# Patient Record
Sex: MALE | Race: WHITE
[De-identification: names, ages, dates, MRNs, and addresses within clinical notes are randomized per-mention and may not be internally consistent; named-entity substitution may affect disease eponyms.]

---

## 2022-10-06 ENCOUNTER — HOSPITAL ENCOUNTER (OUTPATIENT)
Dept: HOSPITAL 47 - RADXRMAIN | Age: 62
Discharge: HOME | End: 2022-10-06
Attending: FAMILY MEDICINE
Payer: COMMERCIAL

## 2022-10-06 DIAGNOSIS — R05.9: Primary | ICD-10-CM

## 2022-10-06 PROCEDURE — 71046 X-RAY EXAM CHEST 2 VIEWS: CPT

## 2022-10-07 NOTE — XR
EXAMINATION TYPE: XR chest 2V

 

DATE OF EXAM: 10/6/2022

 

COMPARISON: NONE

 

TECHNIQUE: PA and lateral views submitted.

 

HISTORY: Cough

 

FINDINGS:

The lungs are clear and  there is no pneumothorax, pleural effusion, or focal pneumonia.  Heart size 
normal. Mild hyperinflation. No overt failure. Calcified lymph nodes in the hilum and mediastinum. Ti
ny granuloma suspected in the retrosternal space and within the right lower lobe near the costophreni
c angle.

 

IMPRESSION: 

1. No acute process. Correlate for COPD or asthma with changes of chronic granulomatous disease.

## 2022-11-17 ENCOUNTER — HOSPITAL ENCOUNTER (OUTPATIENT)
Dept: HOSPITAL 47 - LABPAT | Age: 62
Discharge: HOME | End: 2022-11-17
Attending: ORTHOPAEDIC SURGERY
Payer: COMMERCIAL

## 2022-11-17 DIAGNOSIS — Z01.812: Primary | ICD-10-CM

## 2022-11-17 DIAGNOSIS — Z22.322: ICD-10-CM

## 2022-11-17 DIAGNOSIS — M17.12: ICD-10-CM

## 2022-11-17 PROCEDURE — 87070 CULTURE OTHR SPECIMN AEROBIC: CPT

## 2022-12-28 NOTE — HP
HISTORY AND PHYSICAL



DATE OF SURGERY:

12/29/2022.



HISTORY OF PRESENT ILLNESS:

Ricardo Romero is a 62-year-old gentleman, seen with progressive left knee pain.  After

having treatment options discussed, he elected to proceed with left total knee

arthroplasty.  Consent was obtained.  Medical clearance was provided by Dr. Rey Gallego.



PAST MEDICAL HISTORY:

Hypertension.



PAST SURGICAL HISTORY:

Herniorrhaphy and eye surgery.



DAILY MEDICATIONS:

Antihypertensive.



ALLERGIES:

None.



SOCIAL HISTORY:

He denies tobacco use.



PHYSICAL EVALUATION OF THE LEFT KNEE:

His range of motion is -2/3 to 120.  Moderate effusion.  Tenderness in medial joint

line.  Tenderness in lateral joint line.  Crepitus in medial and patellofemoral

compartments with range of motion.  Some pain with patellofemoral compression.

Ligaments are stable.  Hip rotation is without pain.  Distal neurovascular exam is

intact.



IMAGING STUDIES:

Radiographs of the left knee reveal severe osteoarthritic changes.



IMPRESSION:

1. Left knee osteoarthritis.

2. Hypertension.



PLAN:

Left total knee arthroplasty.





MMODL / IJN: [id number] / Job#: [id number]

## 2022-12-29 ENCOUNTER — HOSPITAL ENCOUNTER (OUTPATIENT)
Dept: HOSPITAL 47 - OR | Age: 62
Discharge: HOME HEALTH | End: 2022-12-29
Attending: ORTHOPAEDIC SURGERY
Payer: COMMERCIAL

## 2022-12-29 VITALS — HEART RATE: 93 BPM | DIASTOLIC BLOOD PRESSURE: 85 MMHG | SYSTOLIC BLOOD PRESSURE: 156 MMHG | RESPIRATION RATE: 17 BRPM

## 2022-12-29 VITALS — TEMPERATURE: 97.8 F

## 2022-12-29 DIAGNOSIS — Z98.890: ICD-10-CM

## 2022-12-29 DIAGNOSIS — I10: ICD-10-CM

## 2022-12-29 DIAGNOSIS — G89.18: ICD-10-CM

## 2022-12-29 DIAGNOSIS — M17.12: Primary | ICD-10-CM

## 2022-12-29 DIAGNOSIS — Z79.899: ICD-10-CM

## 2022-12-29 PROCEDURE — 27447 TOTAL KNEE ARTHROPLASTY: CPT

## 2022-12-29 PROCEDURE — 76942 ECHO GUIDE FOR BIOPSY: CPT

## 2022-12-29 PROCEDURE — 64999 UNLISTED PX NERVOUS SYSTEM: CPT

## 2022-12-29 PROCEDURE — 64448 NJX AA&/STRD FEM NRV NFS IMG: CPT

## 2022-12-29 PROCEDURE — 88300 SURGICAL PATH GROSS: CPT

## 2022-12-29 PROCEDURE — 73560 X-RAY EXAM OF KNEE 1 OR 2: CPT

## 2022-12-29 PROCEDURE — 64447 NJX AA&/STRD FEMORAL NRV IMG: CPT

## 2022-12-29 PROCEDURE — 97162 PT EVAL MOD COMPLEX 30 MIN: CPT

## 2022-12-29 RX ADMIN — HYDROMORPHONE HYDROCHLORIDE PRN MG: 1 INJECTION, SOLUTION INTRAMUSCULAR; INTRAVENOUS; SUBCUTANEOUS at 13:20

## 2022-12-29 RX ADMIN — POTASSIUM CHLORIDE SCH MLS: 14.9 INJECTION, SOLUTION INTRAVENOUS at 12:52

## 2022-12-29 RX ADMIN — HYDROMORPHONE HYDROCHLORIDE PRN MG: 1 INJECTION, SOLUTION INTRAMUSCULAR; INTRAVENOUS; SUBCUTANEOUS at 12:25

## 2022-12-29 RX ADMIN — POTASSIUM CHLORIDE SCH MLS: 14.9 INJECTION, SOLUTION INTRAVENOUS at 09:02

## 2022-12-29 RX ADMIN — HYDROMORPHONE HYDROCHLORIDE PRN MG: 1 INJECTION, SOLUTION INTRAMUSCULAR; INTRAVENOUS; SUBCUTANEOUS at 12:30

## 2022-12-29 NOTE — P.ANPRN
Procedure Note - Anesthesia





- Nerve Block Performed


  ** Right Daniel Single


Time Out Performed: Yes


Date of Procedure: 12/29/22


Procedure Start Time: 09:33


Procedure Stop Time: 09:39


Location of Patient: PreOp


Indication: Acute Post-Operative Pain, Requested by Surgeon


Sedation Type: Sedate with meaningful contact maintained


Preparation: Sterile Prep


Position: Supine


Needle Types: Pajunk


Needle Gauge: 21


Ultrasound used to visualize needle placement: Yes


Ultrasound used to observe medication spread: Yes


Blood Aspirated: No


Pain Paresthesia on Injection Noted: No


Resistance on Injection: Normal


Image Stored and Saved: Yes


Events: Uneventful and Well Tolerated (ropi .5% 25cc plus dexamethasone 4mg)

## 2022-12-29 NOTE — P.OP
Date of Procedure: 12/29/22


Preoperative Diagnosis: 


Left knee osteoarthritis


Postoperative Diagnosis: 


Left knee osteoarthritis


Procedure(s) Performed: 


Left total knee arthroplasty


Implants: 


1.  Depuy attune size 9 left cruciate retaining cemented femur


2.  Depuy attune size 9 fixed bearing cemented tibial baseplate


3.  Depuy attune size 9 fixed bearing cruciate retaining 10 mm polyethylene 

tibial insert


4.  Depuy attune 41 mm all polyethylene cemented patella


Anesthesia: GETA, regional (Adductor canal catheter, Ipack block)


Surgeon: Jarrett Ramirez


Assistant #1: Martín Parker


Estimated Blood Loss (ml): 45


Pathology: other (Bone)


Condition: stable


Disposition: PACU


Indications for Procedure: 


62-year-old patient seen with progressive left knee pain.  After treatment 

options were discussed, he elected to proceed with total knee arthroplasty.


Operative Findings: 


See description of procedure


Description of Procedure: 


Patient was taken to the operative suite after having an adductor canal catheter

placed by the department of anesthesia.  Patient underwent a general anesthetic 

by the department of anesthesia.  Patient was given preoperative IV intake 

antibiotics and TXA.  A well-padded tourniquet was placed about the left lower 

extremity.  The lower extremity was then prepped and draped in the normal 

sterile orthopedic fashion.  The extremity was elevated, a tourniquet was 

insufflated to 300.  A standard anterior incision was made sharply through skin.

 Dissection was taken down through the subcutaneous soft tissues down to the 

extensor mechanism.  A medial arthrotomy was performed, patella was everted and 

knee was flexed.  There was advanced osteoarthritis noted.  I introduced my 

distal intramedullary femoral drill.  I then introduced the distal femoral 

cutting jig.  Laci CARRERA secured the cutting jig with 2 pins.  I held 

retractors in position while Laci CARRERA performed the distal femoral 

resection through the guide area we now removed her distal femoral cutting 

guide.  We now placed our 4-in-1 femoral cutting block and positioned and it was

secured with 2 pins by Laci CARRERA while I held the block in position.  The 

distal femoral finishing was now completed.  A proximal tibial cutting guide was

positioned.  I held the guide in the appropriate position with both hands while 

Laic CARRERA inserted stabilizing pins into the guide.  Proximal tibial cut 

was made. We now placed a trial femoral component into position, along with an 

appropriate size tibial tray and insert.  We now took the knee through range of 

motion and had full extension good flexion and good overall soft tissue balance 

noted.  The patella was everted and stabilized with 2 towel clips held by Laci CARRERA while I performed a flush with patellar quad tendon utilizing a fresh 

sawblade.  We templated the patella, appropriate drill holes were made.  An 

appropriate trial patella was positioned, knee was taken through full range of 

motion with the patella tracking very nicely.  The trial patella was removed.  

Drill holes were made through the femoral component.  All trial components were 

removed after marking off the appropriate rotation of the tibia.  Retractors 

were now positioned along the proximal tibia.  An appropriate keel punch was 

made with the appropriate size tibial guide by myself on Laci CARRERA assisted

by holding retractors.  At this point appropriate size implants were chosen and 

opened.  The joint was irrigated copiously with pulse lavage mechanical 

irrigation. The wound was irrigated with pulse lavage mechanical irrigation.  We

mixed antibiotic methylmethacrylate.  We placed the knee into flexion.  We 

placed multiple retractors assisted by Laci CARRERA to expose the proximal 

tibia.  Once the methyl methacrylate was ready, the tibial component was 

cemented into place removing any excess methylmethacrylate form by both myself 

and Laci CARRERA.  The femoral component was cemented into place removing the 

removing any excess methylmethacrylate performed by both myself and Laci CARRERA.  We then inserted the appropriate size polyethylene tibial insert.  We made 

sure that it was locked into position.  We took the knee into full extension, 

and then back in a flexion making sure we had removed any excess 

methylmethacrylate.  The patellar component was then cemented down and secured 

with clamp.  Excess methylmethacrylate removed.  We kept the knee in full 

extension, patellar clamp in position until methylmethacrylate had hardened.  

Once it had hardened the patellar clamp was removed.  The knee was taken through

full range of motion.  The patella tracked nicely.  There was good soft tissue 

balancing.  The tourniquet was now released.  Additional hemostasis was achieved

via electrocautery.  A second gram of TXA was given.  The wound again was 

irrigated with pulse lavage mechanical irrigation. The extensor mechanism was 

repaired with Ethibond suture.  We checked the repair with range of motion and 

it was stable.  The subcutaneous soft tissues were repaired with Vicryl in 

layers.  The skin was approximated with pernio/Dermabond.  Sterile dressings 

were applied followed by loose web roll and Ace bandage.  The patient was linares

sferred to a bed, and taken to recovery in stable and satisfactory condition.  

Laci CARRERA assisted with this complex procedure.

## 2022-12-29 NOTE — P.ANPRN
Procedure Note - Anesthesia





- Nerve Block Performed


  ** Left Adductor Canal Infusion


Time Out Performed: Yes


Date of Procedure: 12/29/22


Procedure Start Time: 09:23


Procedure Stop Time: 09:32


Location of Patient: PreOp


Indication: Acute Post-Operative Pain


Sedation Type: Sedate with meaningful contact maintained


Preparation: Sterile Prep


Position: Supine


Catheter: Indwelling


Needle Types: Pajunk


Needle Gauge: 21


Ultrasound used to visualize needle placement: Yes


Ultrasound used to observe medication spread: Yes


Blood Aspirated: No


Pain Paresthesia on Injection Noted: No


Resistance on Injection: Normal


Image Stored and Saved: Yes


Events: Uneventful and Well Tolerated (ropi .5% 20cc plus dexamethasone 4mg)

## 2022-12-29 NOTE — XR
EXAMINATION TYPE: XR knee limited LT

 

DATE OF EXAM: 12/29/2022 12:39 PM

 

INDICATION: 

Patient age:Male;  62 years old; 

Reason for study: Evaluation for Postop abnormality and alignment; PHH. 

 

COMPARISON: None.

 

TECHNIQUE: The Left knee(s) was examined in Frontal, lateral projections.

 

FINDINGS:   Status post total knee arthroplasty changes with hardware in appropriate alignment and in
tact. No evidence of fracture. Subcutaneous lucencies and lucencies within the joint consistent with 
surgical changes. 

 

 

 

IMPRESSION: 

Status post total knee arthroplasty changes with hardware intact and appropriate alignment. No fractu
res identified.

## 2023-10-20 ENCOUNTER — HOSPITAL ENCOUNTER (OUTPATIENT)
Dept: HOSPITAL 47 - RADECHMAIN | Age: 63
Discharge: HOME | End: 2023-10-20
Attending: FAMILY MEDICINE
Payer: COMMERCIAL

## 2023-10-20 DIAGNOSIS — R22.43: Primary | ICD-10-CM

## 2023-10-20 PROCEDURE — 93306 TTE W/DOPPLER COMPLETE: CPT

## 2023-10-20 NOTE — CA
Transthoracic Echo Report 

 Name: Ricardo Romero 

 MRN:    [id number] 

 Age:    63     Gender:     M 

 

 :    1960 

 Exam Date:     10/20/2023 18:08 

 Exam Location: [facility] 

 Ht (in):     74     Wt (lb):     210 

 Ordering Physician:        Rey Gallego DO 

 Attending/Referring Phys: 

 Technician         Naye Ford RDCS 

 Procedure CPT: 

 Indications:       R22.43 LOCALIZED SWELLING, MASS AND LUMP, LOWER LI 

 

 Cardiac Hx: 

 Technical Quality:      Fair 

 Contrast 1:                                Total Dose (mL): 

 Contrast 2:                                Total Dose (mL): 

 

 MEASUREMENTS  (Male / Female) Normal Values 

 2D ECHO 

 LV Diastolic Diameter PLAX        5.0 cm                4.2 - 5.9 / 3.9 - 5.3 cm 

 LV Systolic Diameter PLAX         3.5 cm                 

 IVS Diastolic Thickness           1.5 cm                0.6 - 1.0 / 0.6 - 0.9 cm 

 LVPW Diastolic Thickness          1.3 cm                0.6 - 1.0 / 0.6 - 0.9 cm 

 LV Relative Wall Thickness        0.6                    

 RV Internal Dim ED PLAX           3.9 cm                 

 LA Volume                         76.5 cm???              18 - 58 / 22 - 52 cm??? 

 LA Volume Index                   34.1 cm???/m???           16 - 28 cm???/m??? 

 

 M-MODE 

 Aortic Root Diameter MM           3.3 cm                 

 LA Systolic Diameter MM           4.6 cm                 

 LA Ao Ratio MM                    1.4                    

 AV Cusp Separation MM             2.1 cm                 

 

 DOPPLER 

 AV Peak Velocity                  103.9 cm/s             

 AV Peak Gradient                  4.3 mmHg               

 AV Mean Velocity                  80.3 cm/s              

 AV Mean Gradient                  2.7 mmHg               

 AV Velocity Time Integral         21.4 cm                

 LVOT Peak Velocity                112.7 cm/s             

 LVOT Peak Gradient                5.1 mmHg               

 LVOT Velocity Time Integral       23.0 cm                

 MV Area PHT                       3.3 cm???                

 Mitral E Point Velocity           74.8 cm/s              

 Mitral A Point Velocity           100.3 cm/s             

 Mitral E to A Ratio               0.7                    

 MV Deceleration Time              231.7 ms               

 MV E' Velocity                    10.1 cm/s              

 Mitral E to MV E' Ratio           7.4                    

 TR Peak Velocity                  218.9 cm/s             

 TR Peak Gradient                  19.2 mmHg              

 Right Ventricular Systolic Press  23.6 mmHg              

 

 

 FINDINGS 

 Left Ventricle 

 Moderately increased left ventricular wall thickness. Left ventricular cavity  

 size normal. Normal left ventricular systolic function with no obvious regional  

 wall motion abnormalities. Left ventricular ejection fraction is estimated at  

 55-60%. 

 

 Right Ventricle 

 Moderate right ventricular dilatation. Right ventricular systolic pressure  

 within normal limits. 

 

 Right Atrium 

 Normal right atrial size. 

 

 Left Atrium 

 Moderately increased left atrial volume. Mildly increased left atrial area. 

 

 Mitral Valve 

 Structurally normal mitral valve. No mitral stenosis, regurgitation or  

 prolapse. 

 

 Aortic Valve 

 Trileaflet aortic valve. No aortic valve stenosis or regurgitation. 

 

 Tricuspid Valve 

 Structurally normal tricuspid valve. Mild tricuspid regurgitation. 

 

 Pulmonic Valve 

 Trace pulmonic regurgitation. 

 

 Pericardium 

 No pericardial effusion. 

 

 Aorta 

 Normal size aortic root and proximal ascending aorta. 

 

 CONCLUSIONS 

 LVH with left ventricular ejection fraction of about 50-55% with septal  

 inferior basal and inferoseptal hypokinesis 

 Asymmetric septal hypertrophy/septal bulge 

 Prominent right ventricle/possible mild enlargement 

 Previewed by:  

 Dr. Oliver Joseph MD 

 (Electronically Signed) 

 Final Date:      2023 18:38

## 2025-03-24 ENCOUNTER — HOSPITAL ENCOUNTER (OUTPATIENT)
Dept: HOSPITAL 47 - RADUSWWP | Age: 65
Discharge: HOME | End: 2025-03-24
Attending: FAMILY MEDICINE
Payer: COMMERCIAL

## 2025-03-24 DIAGNOSIS — M85.662: Primary | ICD-10-CM

## 2025-03-24 NOTE — US
EXAMINATION TYPE: US venous doppler duplex LE LT

 

DATE OF EXAM: 3/24/2025 4:40 PM

 

COMPARISON: NONE

 

CLINICAL INDICATION: Male, 64 years old with history of M79.662 PAIN IN LEFT LOWER LEG; pain in left 
calf , Pain

 

TECHNIQUE:  The lower extremity deep venous system is examined utilizing real time linear array sonog
bertin with graded compression, color doppler sonography, and spectral doppler.

 

SIDE PERFORMED: Left  

 

FINDINGS:

 

VESSELS IMAGED:

Common Femoral Vein

Deep Femoral Vein

Greater Saphenous Vein *

Femoral Vein

Popliteal Vein

Small Saphenous Vein *

Proximal Calf Veins

(* superficial vessels)

 

 

Left Leg:  Negative for DVT, Color Doppler imaging shows patency of the vessels. Spectral waveforms a
re within normal limits. 

 

Complex fluid area seen left calf medial measuring 7.0 x 2.9 x 3.4 cm. 

 

IMPRESSION: 

1.  No ultrasound evidence for deep venous thrombosis.

2.  Large cystic lesion in the area of patient's pain measuring up to 7.0 cm possibly representing a 
Baker's cyst. Further evaluation with MRI recommended.

 

 

[facility], Workstation: [id number], 3/24/2025 4:54 PM